# Patient Record
Sex: MALE | URBAN - METROPOLITAN AREA
[De-identification: names, ages, dates, MRNs, and addresses within clinical notes are randomized per-mention and may not be internally consistent; named-entity substitution may affect disease eponyms.]

---

## 2021-02-16 ENCOUNTER — OFFICE VISIT CONVERTED (OUTPATIENT)
Dept: ORTHOPEDIC SURGERY | Facility: CLINIC | Age: 41
End: 2021-02-16
Attending: ORTHOPAEDIC SURGERY

## 2021-04-06 ENCOUNTER — OFFICE VISIT CONVERTED (OUTPATIENT)
Dept: ORTHOPEDIC SURGERY | Facility: CLINIC | Age: 41
End: 2021-04-06
Attending: ORTHOPAEDIC SURGERY

## 2021-05-10 NOTE — H&P
"   History and Physical      Patient Name: Mayur Cano   Patient ID: [id number]   Sex: Male   YOB: 1980    Referring Provider: ULICES DALEY DO    Visit Date: February 16, 2021    Provider: Earl Monaco MD   Location: [facility]   Location Address: [address] [place] [address]  [id number]   Location Phone: [phone]          Chief Complaint  · Right knee pain      History Of Present Illness  Mayur Cano is a 40 year old male who presents today to [facility]. Patient presents for evaluation of right knee pain. Patient denies specific injury. Patient states the right knee has been bothering him for about a year, he states he has pain \"all over\" the knee but specifically in the medial knee. Patient is very active, he is an [employer] with [employer], he states that pain is worse with bending the knee and squatting. Patient denies locking, catching or buckling. Patient states the knee had swelling a year ago with kneeling on the knee but states it went away. Pain is worse with certain movements and activities, is not constant, comes and goes.       Allergy List  NO KNOWN DRUG ALLERGIES       Allergies Reconciled  Social History  Tobacco (Never)         Review of Systems  · Constitutional  o Denies  o : fever, chills, weight loss  · Cardiovascular  o Denies  o : chest pain, shortness of breath  · Gastrointestinal  o Denies  o : liver disease, heartburn, nausea, blood in stools  · Genitourinary  o Denies  o : painful urination, blood in urine  · Integument  o Denies  o : rash, itching  · Neurologic  o Denies  o : headache, weakness, loss of consciousness  · Musculoskeletal  o Denies  o : painful, swollen joints  · Psychiatric  o Denies  o : drug/alcohol addiction, anxiety, depression      Vitals  Date Time BP Position Site L\R Cuff Size HR RR TEMP (F) WT  HT  BMI kg/m2 BSA m2 O2 Sat FR L/min FiO2        02/16/2021 10:12 AM      91 - R   179lbs 6oz 6'  1\" 23.67 2.05 96 %  "           Physical Examination  · Constitutional  o Appearance  o : well developed, well-nourished, no obvious deformities present  · Head and Face  o Head  o :   § Inspection  § : normocephalic  o Face  o :   § Inspection  § : no facial lesions  · Eyes  o Conjunctivae  o : conjunctivae normal  o Sclerae  o : sclerae white  · Ears, Nose, Mouth and Throat  o Ears  o :   § External Ears  § : appearance within normal limits  § Hearing  § : intact  o Nose  o :   § External Nose  § : appearance normal  · Neck  o Inspection/Palpation  o : normal appearance  o Range of Motion  o : full range of motion  · Respiratory  o Respiratory Effort  o : breathing unlabored  o Inspection of Chest  o : normal appearance  o Auscultation of Lungs  o : no audible wheezing or rales  · Cardiovascular  o Heart  o : regular rate  · Gastrointestinal  o Abdominal Examination  o : soft and non-tender  · Skin and Subcutaneous Tissue  o General Inspection  o : intact, no rashes  · Psychiatric  o General  o : Alert and oriented x3  o Judgement and Insight  o : judgment and insight intact  o Mood and Affect  o : mood normal, affect appropriate  · Right Knee  o Inspection  o : Skin is intact, no erythema, no ecchymosis, no swelling, no effusion. Tender to palpation of medial knee. Full extension, flexion 120, stable AtoP, stable to varus/valgus stress. Negative Logan.   · In Office Procedures  o View  o : LAT/SUNRISE/STANDING  o Site  o : right, knee  o Indication  o : Right knee pain  o Study  o : X-rays ordered, taken in the office, and reviewed today.  o Xray  o : No fracture, no dislocation, mild tricompartmental degenerative changes.   o Comparative Data  o : No comparative data found  · Imaging  o Imaging  o : MRI Right Knee wo contrast, 2/4/21, IMPRESSION: 1. A tear extending to the inferior articular surface in the body and posterior horn medial meniscus, with additional tearing involving the free edge of the posterior horn medial  meniscus. 2. Small region of marrow edema in the medial tibial plateau at the level of the body of the medial meniscus, suggesting a small area of stress response, bone contusion or reactive edema. 3. Small joint effusion.           Assessment  · Right MMT (medial meniscus tear)     836.0/S83.249A  · Right knee pain, unspecified chronicity     719.46/M25.561      Plan  · Orders  o Knee (Right) [facility] Preferred View ([id number]) - 719.46/M25.561 - 02/16/2021  · Medications  o Medications have been Reconciled  o Transition of Care or Provider Policy  · Instructions  o Reviewed the patient's Past Medical, Social, and Family history as well as the ROS at today's visit, no changes.  o Call or return if worsening symptoms.  o Follow up in 6-8 weeks.  o The above service was scribed by Balbir Alberto PA-C on my behalf and I attest to the accuracy of the note. jsb  o Reviewed Xray and MRI with the patient, discussed diagnosis and treatment options with patient including conservative versus surgical intervention. Patient would like to pursue conservative treatment with physical therapy and NSAIDS. Patient was offered injection and he refused. Patient was given prescription for Diclofenac. Follow up in 6-8 weeks for reevaluation.   · Referrals  o ID: [id number] Date: 02/16/2021 Type: Inbound  Specialty: Orthopedic Surgery            Electronically Signed by: Daniel Alberto PA-C -Author on February 16, 2021 11:27:57 AM  Electronically Co-signed by: Earl Monaco MD -Reviewer on February 17, 2021 02:34:22 PM

## 2021-05-14 VITALS — HEIGHT: 73 IN | HEART RATE: 94 BPM | WEIGHT: 177 LBS | BODY MASS INDEX: 23.46 KG/M2 | OXYGEN SATURATION: 98 %

## 2021-05-14 VITALS — BODY MASS INDEX: 23.77 KG/M2 | OXYGEN SATURATION: 96 % | HEIGHT: 73 IN | HEART RATE: 91 BPM | WEIGHT: 179.37 LBS

## 2021-05-14 NOTE — PROGRESS NOTES
Progress Note      Patient Name: Mayur Cano   Patient ID: [id number]   Sex: Male   YOB: 1980        Visit Date: April 6, 2021    Provider: Earl Monaco MD   Location: [facility]   Location Address: [address] [place] [address]  [id number]   Location Phone: [phone]          Chief Complaint  · Right knee pain       History Of Present Illness  Mayur Cano is a 40 year old male who presents today to [facility].      The patient presents here today for follow up evaluation of his right knee. The patient states his knee hasn't gotten worse but has only been taking the diclofenac sodium as needed. He states pain is worse with activity. he locates his pain to the medial aspect of his knee. He previously had an MRI that revealed a meniscus tear. He has been attending physical therapy on post. The patient states he is no ready for operative treatment but is now considering a steroid injection as we had previously discussed.       Past Medical History  ***No Significant Medical History         Medication List  diclofenac sodium 75 mg oral tablet,delayed release (DR/EC)         Allergy List  NO KNOWN DRUG ALLERGIES; NO KNOWN DRUG ALLERGIES       Allergies Reconciled  Family Medical History  *No Known Family History         Social History  Alcohol Use (Current some day); lives with children; lives with spouse; .; Recreational Drug Use (Never); Tobacco (Never); Working         Review of Systems  · Constitutional  o Denies  o : fever, chills, weight loss  · Cardiovascular  o Denies  o : chest pain, shortness of breath  · Gastrointestinal  o Denies  o : liver disease, heartburn, nausea, blood in stools  · Genitourinary  o Denies  o : painful urination, blood in urine  · Integument  o Denies  o : rash, itching  · Neurologic  o Denies  o : headache, weakness, loss of consciousness  · Musculoskeletal  o Denies  o : painful, swollen joints  · Psychiatric  o Denies  o :  "drug/alcohol addiction, anxiety, depression      Vitals  Date Time BP Position Site L\R Cuff Size HR RR TEMP (F) WT  HT  BMI kg/m2 BSA m2 O2 Sat FR L/min FiO2 HC       04/06/2021 01:44 PM      94 - R   176lbs 16oz 6'  1\" 23.35 2.03 98 %            Physical Examination  · Constitutional  o Appearance  o : well developed, well-nourished, no obvious deformities present  · Head and Face  o Head  o :   § Inspection  § : normocephalic  o Face  o :   § Inspection  § : no facial lesions  · Eyes  o Conjunctivae  o : conjunctivae normal  o Sclerae  o : sclerae white  · Ears, Nose, Mouth and Throat  o Ears  o :   § External Ears  § : appearance within normal limits  § Hearing  § : intact  o Nose  o :   § External Nose  § : appearance normal  · Neck  o Inspection/Palpation  o : normal appearance  o Range of Motion  o : full range of motion  · Respiratory  o Respiratory Effort  o : breathing unlabored  o Inspection of Chest  o : normal appearance  o Auscultation of Lungs  o : no audible wheezing or rales  · Cardiovascular  o Heart  o : regular rate  · Gastrointestinal  o Abdominal Examination  o : soft and non-tender  · Skin and Subcutaneous Tissue  o General Inspection  o : intact, no rashes  · Psychiatric  o General  o : Alert and oriented x3  o Judgement and Insight  o : judgment and insight intact  o Mood and Affect  o : mood normal, affect appropriate  · Right Knee  o Inspection  o : Extension 0. 140 Flexion. Stable to varus and valgus stress. Stable to anterior and posterior drawer. Tender to the medial part of the knee. Negative Logan's. Positive EHL, FHL, GS, and TA. Sensation intact to light touch all 5 nerves of the foot. Positive Pulses.   · Injection Note/Aspiration Note  o Site  o : Right knee  o Procedure  o : Procedure: After educating the patient, patient gave consent for procedure. After using Chloraprep, the joint space was injected. The patient tolerated the procedure well.  o Medication  o : 80 mg of " DepoMedrol with 9cc of 1% Lidocaine          Assessment  · MMT (medial meniscus tear)     836.0/S83.249A  · Right knee pain, unspecified chronicity     719.46/M25.561      Plan  · Orders  o Depo-Medrol injection 80mg () - - 04/06/2021   Lot [id number] Exp 02 2022 [employer] Administered by NINA GOMEZ MD  o Knee Intra-articular Injection without US Guidance [facility] ([id number]) - - 04/06/2021   Lot [id number] Exp 02 01 2022 [employer] Administered by NINA GOMEZ MD  · Medications  o Medications have been Reconciled  o Transition of Care or Provider Policy  · Instructions  o Reviewed the patient's Past Medical, Social, and Family history as well as the ROS at today's visit, no changes.  o Call or return if worsening symptoms.  o The above service was scribed by Rekha Laurent on my behalf and I attest to the accuracy of the note. jsb  o Discussed the treatment plan with the patient. Discussed the risks and benefits of a steroid injection in his right knee. The patient expressed understanding and wished to proceed. He tolerated the injection well. Follow up in 6 weeks to recheck.   o Electronically Identified Patient Education Materials Provided Electronically  · Referrals  o ID: [id number] Date: 02/16/2021 Type: Inbound  Specialty: Orthopedic Surgery            Electronically Signed by: Rekha Laurent MA -Author on April 6, 2021 03:06:47 PM  Electronically Co-signed by: Nina Gomez MD -Reviewer on April 6, 2021 09:28:59 PM